# Patient Record
Sex: FEMALE | Race: WHITE | NOT HISPANIC OR LATINO | Employment: FULL TIME | URBAN - METROPOLITAN AREA
[De-identification: names, ages, dates, MRNs, and addresses within clinical notes are randomized per-mention and may not be internally consistent; named-entity substitution may affect disease eponyms.]

---

## 2020-10-08 ENCOUNTER — OFFICE VISIT (OUTPATIENT)
Dept: FAMILY MEDICINE CLINIC | Facility: CLINIC | Age: 25
End: 2020-10-08

## 2020-10-08 VITALS
BODY MASS INDEX: 20.32 KG/M2 | RESPIRATION RATE: 18 BRPM | HEART RATE: 100 BPM | SYSTOLIC BLOOD PRESSURE: 100 MMHG | DIASTOLIC BLOOD PRESSURE: 60 MMHG | TEMPERATURE: 98 F | WEIGHT: 119 LBS | OXYGEN SATURATION: 99 % | HEIGHT: 64 IN

## 2020-10-08 DIAGNOSIS — Z86.2 HISTORY OF ANEMIA: ICD-10-CM

## 2020-10-08 DIAGNOSIS — Z76.89 ENCOUNTER TO ESTABLISH CARE: Primary | ICD-10-CM

## 2020-10-08 DIAGNOSIS — R63.0 DECREASED APPETITE: ICD-10-CM

## 2020-10-08 DIAGNOSIS — G43.719 INTRACTABLE CHRONIC MIGRAINE WITHOUT AURA AND WITHOUT STATUS MIGRAINOSUS: ICD-10-CM

## 2020-10-08 DIAGNOSIS — K92.1 BLOOD IN STOOL: ICD-10-CM

## 2020-10-08 DIAGNOSIS — R19.7 DIARRHEA, UNSPECIFIED TYPE: ICD-10-CM

## 2020-10-08 DIAGNOSIS — R11.0 NAUSEA: ICD-10-CM

## 2020-10-08 LAB
ALBUMIN SERPL-MCNC: 4.8 G/DL (ref 3.5–5.2)
ALBUMIN/GLOB SERPL: 1.8 G/DL
ALP SERPL-CCNC: 50 U/L (ref 39–117)
ALT SERPL W P-5'-P-CCNC: 19 U/L (ref 1–33)
ANION GAP SERPL CALCULATED.3IONS-SCNC: 8.6 MMOL/L (ref 5–15)
AST SERPL-CCNC: 21 U/L (ref 1–32)
BILIRUB SERPL-MCNC: 0.3 MG/DL (ref 0–1.2)
BUN SERPL-MCNC: 9 MG/DL (ref 6–20)
BUN/CREAT SERPL: 14.5 (ref 7–25)
CALCIUM SPEC-SCNC: 9.4 MG/DL (ref 8.6–10.5)
CHLORIDE SERPL-SCNC: 103 MMOL/L (ref 98–107)
CHOLEST SERPL-MCNC: 112 MG/DL (ref 0–200)
CO2 SERPL-SCNC: 26.4 MMOL/L (ref 22–29)
CREAT SERPL-MCNC: 0.62 MG/DL (ref 0.57–1)
ERYTHROCYTE [DISTWIDTH] IN BLOOD BY AUTOMATED COUNT: 11.9 % (ref 12.3–15.4)
FERRITIN SERPL-MCNC: 30.7 NG/ML (ref 13–150)
FOLATE SERPL-MCNC: >20 NG/ML (ref 4.78–24.2)
GFR SERPL CREATININE-BSD FRML MDRD: 117 ML/MIN/1.73
GLOBULIN UR ELPH-MCNC: 2.7 GM/DL
GLUCOSE SERPL-MCNC: 82 MG/DL (ref 65–99)
HCT VFR BLD AUTO: 37.8 % (ref 34–46.6)
HDLC SERPL-MCNC: 54 MG/DL (ref 40–60)
HGB BLD-MCNC: 12.7 G/DL (ref 12–15.9)
IRON 24H UR-MRATE: 58 MCG/DL (ref 37–145)
IRON SATN MFR SERPL: 15 % (ref 20–50)
LDLC SERPL CALC-MCNC: 47 MG/DL (ref 0–100)
LDLC/HDLC SERPL: 0.91 {RATIO}
LYMPHOCYTES # BLD AUTO: 2.7 10*3/MM3 (ref 0.7–3.1)
LYMPHOCYTES NFR BLD AUTO: 39.5 % (ref 19.6–45.3)
MCH RBC QN AUTO: 30.2 PG (ref 26.6–33)
MCHC RBC AUTO-ENTMCNC: 33.5 G/DL (ref 31.5–35.7)
MCV RBC AUTO: 90.1 FL (ref 79–97)
MONOCYTES # BLD AUTO: 0.5 10*3/MM3 (ref 0.1–0.9)
MONOCYTES NFR BLD AUTO: 7.1 % (ref 5–12)
NEUTROPHILS NFR BLD AUTO: 3.6 10*3/MM3 (ref 1.7–7)
NEUTROPHILS NFR BLD AUTO: 53.4 % (ref 42.7–76)
PLATELET # BLD AUTO: 249 10*3/MM3 (ref 140–450)
PMV BLD AUTO: 8.5 FL (ref 6–12)
POTASSIUM SERPL-SCNC: 4.4 MMOL/L (ref 3.5–5.2)
PROT SERPL-MCNC: 7.5 G/DL (ref 6–8.5)
RBC # BLD AUTO: 4.19 10*6/MM3 (ref 3.77–5.28)
SODIUM SERPL-SCNC: 138 MMOL/L (ref 136–145)
TIBC SERPL-MCNC: 395 MCG/DL (ref 298–536)
TRANSFERRIN SERPL-MCNC: 265 MG/DL (ref 200–360)
TRIGL SERPL-MCNC: 44 MG/DL (ref 0–150)
TSH SERPL DL<=0.05 MIU/L-ACNC: 0.97 UIU/ML (ref 0.27–4.2)
VIT B12 BLD-MCNC: 405 PG/ML (ref 211–946)
VLDLC SERPL-MCNC: 11 MG/DL (ref 5–40)
WBC # BLD AUTO: 6.8 10*3/MM3 (ref 3.4–10.8)

## 2020-10-08 PROCEDURE — 84443 ASSAY THYROID STIM HORMONE: CPT | Performed by: NURSE PRACTITIONER

## 2020-10-08 PROCEDURE — 85025 COMPLETE CBC W/AUTO DIFF WBC: CPT | Performed by: NURSE PRACTITIONER

## 2020-10-08 PROCEDURE — 82746 ASSAY OF FOLIC ACID SERUM: CPT | Performed by: NURSE PRACTITIONER

## 2020-10-08 PROCEDURE — 99204 OFFICE O/P NEW MOD 45 MIN: CPT | Performed by: NURSE PRACTITIONER

## 2020-10-08 PROCEDURE — 82607 VITAMIN B-12: CPT | Performed by: NURSE PRACTITIONER

## 2020-10-08 PROCEDURE — 83540 ASSAY OF IRON: CPT | Performed by: NURSE PRACTITIONER

## 2020-10-08 PROCEDURE — 82728 ASSAY OF FERRITIN: CPT | Performed by: NURSE PRACTITIONER

## 2020-10-08 PROCEDURE — 80053 COMPREHEN METABOLIC PANEL: CPT | Performed by: NURSE PRACTITIONER

## 2020-10-08 PROCEDURE — 84466 ASSAY OF TRANSFERRIN: CPT | Performed by: NURSE PRACTITIONER

## 2020-10-08 PROCEDURE — 80061 LIPID PANEL: CPT | Performed by: NURSE PRACTITIONER

## 2020-10-08 RX ORDER — ONDANSETRON 4 MG/1
4 TABLET, FILM COATED ORAL EVERY 8 HOURS PRN
Qty: 20 TABLET | Refills: 0 | Status: SHIPPED | OUTPATIENT
Start: 2020-10-08

## 2020-10-08 RX ORDER — SUMATRIPTAN 50 MG/1
TABLET, FILM COATED ORAL
Qty: 12 TABLET | Refills: 0 | Status: SHIPPED | OUTPATIENT
Start: 2020-10-08 | End: 2021-10-01 | Stop reason: HOSPADM

## 2020-10-08 NOTE — PROGRESS NOTES
Subjective   Gail Powers is a 25 y.o. female.     History of Present Illness   Here today to establish care,  no recent pcp, prev saw [facility], she works at [employer], she is single, in relationship, she has 2 children. She does stay active, hiking, she denies hx of murmur or asthma. Not currently taking any medications. She denies smoking.   C/o HA, states she has daily HA, started about 2 years ago, she does have hx of migraines, wakes up daily with HA, at times severe, she has vomiting, nausea, dizziness at times. She has blurred vision at times, she does have phonophobia, denies photophobia. She denies HA with menses. She has prev tried HA meds before but states made her sick. States she was supposed to have jaw surgery as child, she states over bite,denies any injury, she is wondering if contributes to HA. She has had imaging for HA as child, no recent imaging.   Also c/o nausea, states she has loose stools, has about 4 BM in am, she states she had colonoscopy at age 12 d/t anemia, possible IBS, states she has tried marijuana to force to eat, not currently taking iron for anemia. States diet is poor when she does eat she does not eat well. She does noticed blood in stool a times. She denies family hx of crohns or UC, does have family hx of colon cancer in mat grandfather.   States pap was due in august, had mirena removed, she sees [facility], she is due for pap. States last pap 2016, normal. Hx of genital herpes. She states LMP 9/22/2020, regular.     The following portions of the patient's history were reviewed and updated as appropriate: allergies, current medications, past family history, past medical history, past social history, past surgical history and problem list.    Review of Systems   Constitutional: Positive for appetite change. Negative for chills, diaphoresis and fever.   Eyes: Negative for photophobia.   Respiratory: Negative for cough and shortness of breath.    Cardiovascular: Negative for  chest pain.   Gastrointestinal: Positive for blood in stool, diarrhea and nausea. Negative for abdominal pain, constipation and GERD.   Musculoskeletal: Negative for arthralgias and myalgias.   Neurological: Positive for headache. Negative for dizziness and light-headedness.   All other systems reviewed and are negative.      Objective   Physical Exam  Vitals signs and nursing note reviewed.   Constitutional:       Appearance: She is well-developed.   HENT:      Head: Normocephalic.   Eyes:      Pupils: Pupils are equal, round, and reactive to light.   Cardiovascular:      Rate and Rhythm: Normal rate and regular rhythm.      Heart sounds: Normal heart sounds.   Pulmonary:      Effort: Pulmonary effort is normal.      Breath sounds: Normal breath sounds.   Abdominal:      General: Abdomen is flat.      Palpations: Abdomen is soft.      Tenderness: There is generalized abdominal tenderness.   Skin:     General: Skin is warm and dry.   Neurological:      Mental Status: She is alert and oriented to person, place, and time.      Cranial Nerves: Cranial nerves are intact.      Sensory: Sensation is intact.      Motor: Motor function is intact.      Coordination: Coordination is intact.      Gait: Gait is intact.   Psychiatric:         Behavior: Behavior normal.         Judgment: Judgment normal.           Assessment/Plan   Gail was seen today for headache and nausea.    Diagnoses and all orders for this visit:    Encounter to establish care  -     Comprehensive Metabolic Panel  -     CBC & Differential  -     Lipid Panel  -     TSH  -     Ferritin  -     Iron Profile  -     Vitamin B12 & Folate  -     Clostridium Difficile EIA - Stool, Per Rectum; Future  -     Occult Blood, Fecal By Immunoassay - Stool, Per Rectum; Future  -     Giardia / Cryptosporidium Screen - Stool, Per Rectum; Future  -     Stool Culture (Reference Lab) - Stool, Per Rectum; Future  -     CBC Auto Differential    History of anemia  -      Comprehensive Metabolic Panel  -     CBC & Differential  -     Lipid Panel  -     TSH  -     Ferritin  -     Iron Profile  -     Vitamin B12 & Folate  -     Clostridium Difficile EIA - Stool, Per Rectum; Future  -     Occult Blood, Fecal By Immunoassay - Stool, Per Rectum; Future  -     Giardia / Cryptosporidium Screen - Stool, Per Rectum; Future  -     Stool Culture (Reference Lab) - Stool, Per Rectum; Future  -     CBC Auto Differential    Diarrhea, unspecified type  -     Comprehensive Metabolic Panel  -     CBC & Differential  -     Lipid Panel  -     TSH  -     Ferritin  -     Iron Profile  -     Vitamin B12 & Folate  -     Clostridium Difficile EIA - Stool, Per Rectum; Future  -     Occult Blood, Fecal By Immunoassay - Stool, Per Rectum; Future  -     Giardia / Cryptosporidium Screen - Stool, Per Rectum; Future  -     Stool Culture (Reference Lab) - Stool, Per Rectum; Future  -     Ambulatory Referral to Gastroenterology  -     CBC Auto Differential    Intractable chronic migraine without aura and without status migrainosus  -     Comprehensive Metabolic Panel  -     CBC & Differential  -     Lipid Panel  -     TSH  -     Ferritin  -     Iron Profile  -     Vitamin B12 & Folate  -     Clostridium Difficile EIA - Stool, Per Rectum; Future  -     Occult Blood, Fecal By Immunoassay - Stool, Per Rectum; Future  -     Giardia / Cryptosporidium Screen - Stool, Per Rectum; Future  -     Stool Culture (Reference Lab) - Stool, Per Rectum; Future  -     CBC Auto Differential    Nausea  -     Comprehensive Metabolic Panel  -     CBC & Differential  -     Lipid Panel  -     TSH  -     Ferritin  -     Iron Profile  -     Vitamin B12 & Folate  -     Clostridium Difficile EIA - Stool, Per Rectum; Future  -     Occult Blood, Fecal By Immunoassay - Stool, Per Rectum; Future  -     Giardia / Cryptosporidium Screen - Stool, Per Rectum; Future  -     Stool Culture (Reference Lab) - Stool, Per Rectum; Future  -     Ambulatory  Referral to Gastroenterology  -     CBC Auto Differential    Decreased appetite  -     Comprehensive Metabolic Panel  -     CBC & Differential  -     Lipid Panel  -     TSH  -     Ferritin  -     Iron Profile  -     Vitamin B12 & Folate  -     Clostridium Difficile EIA - Stool, Per Rectum; Future  -     Occult Blood, Fecal By Immunoassay - Stool, Per Rectum; Future  -     Giardia / Cryptosporidium Screen - Stool, Per Rectum; Future  -     Stool Culture (Reference Lab) - Stool, Per Rectum; Future  -     Ambulatory Referral to Gastroenterology  -     CBC Auto Differential    Blood in stool  -     Ambulatory Referral to Gastroenterology    Other orders  -     ondansetron (Zofran) 4 MG tablet; Take 1 tablet by mouth Every 8 (Eight) Hours As Needed for Nausea or Vomiting.  -     SUMAtriptan (Imitrex) 50 MG tablet; Take one tablet at onset of headache. May repeat dose one time in 2 hours if headache not relieved.      Labs today will call with results.   Stool cultures ordered she will return to office.   She will monitor diet, increase fiber, may try benefiber OTC.  zofran as needed for nausea,  Refer to GI will call with appt.   Trial imitrex 50mg at onset of HA, may repeat 2 hours later if needed.   Encouraged HA diary, if symptoms persist will order imaging, neuro consult if needed for HA.   If any worsening symptoms, HA, abd pain advise Er.   Encouraged f/u for over due pap either here or with GYN. Encourage marijuana cessation.   Increase fluid intake, get plenty of rest.   Patient agrees with plan of care and understands instructions. Call if worsening symptoms or any problems or concerns.

## 2020-10-08 NOTE — PATIENT INSTRUCTIONS
Labs today will call with results.   Stool cultures ordered she will return to office.   She will monitor diet, increase fiber, may try benefiber OTC.  zofran as needed for nausea,  Refer to GI will call with appt.   Trial imitrex 50mg at onset of HA, may repeat 2 hours later if needed.   Encouraged HA diary, if symptoms persist will order imaging, neuro consult if needed for HA.   If any worsening symptoms, HA, abd pain advise Er.   Encouraged f/u for over due pap either here or with GYN. Encourage marijuana cessation.   Increase fluid intake, get plenty of rest.   Patient agrees with plan of care and understands instructions. Call if worsening symptoms or any problems or concerns.

## 2020-10-28 ENCOUNTER — TELEPHONE (OUTPATIENT)
Dept: GASTROENTEROLOGY | Facility: CLINIC | Age: 25
End: 2020-10-28

## 2021-02-02 ENCOUNTER — OFFICE VISIT (OUTPATIENT)
Dept: GASTROENTEROLOGY | Facility: CLINIC | Age: 26
End: 2021-02-02

## 2021-02-02 VITALS
BODY MASS INDEX: 20.32 KG/M2 | WEIGHT: 119 LBS | SYSTOLIC BLOOD PRESSURE: 98 MMHG | HEIGHT: 64 IN | DIASTOLIC BLOOD PRESSURE: 60 MMHG

## 2021-02-02 DIAGNOSIS — R63.0 POOR APPETITE: ICD-10-CM

## 2021-02-02 DIAGNOSIS — N92.6 MISSED PERIOD: ICD-10-CM

## 2021-02-02 DIAGNOSIS — R19.7 DIARRHEA, UNSPECIFIED TYPE: Primary | Chronic | ICD-10-CM

## 2021-02-02 DIAGNOSIS — R11.0 NAUSEA: Chronic | ICD-10-CM

## 2021-02-02 PROCEDURE — 99203 OFFICE O/P NEW LOW 30 MIN: CPT | Performed by: INTERNAL MEDICINE

## 2021-02-02 RX ORDER — FAMOTIDINE 20 MG/1
20 TABLET, FILM COATED ORAL NIGHTLY
Qty: 60 TABLET | Refills: 3 | Status: SHIPPED | OUTPATIENT
Start: 2021-02-02

## 2021-02-02 NOTE — PROGRESS NOTES
"Chief Complaint   Patient presents with   • Diarrhea   • Nausea   • Rectal Bleeding       Subjective     HPI    Gail Powers is a 25 y.o. female with a past medical history noted below who presents for diarrhea, nausea, abdominal pain.    She says symptoms have been present since childhood.  Describes nausea every morning.  Awakes in the morning, and within about 15 minutes, she needs to have a BM.  Stools are always loose and watery.  Has at least 3 BMs in the morning. Then, after meals, will have further BMs.  Worse with greasy, fatty, spicy foods.  She does see some blood when she has lots of BMs in day.    She does get mid abdominal pain, feels like \"everything\" -- crampy, dull, sharp.    Feels nauseated in the morning.  Doesn't eat until early afternoon, appetite is poor. Can't gain weight but has never been much heavier than her current weight.  Feels she can't eat full meals.  She does endorse a poor diet.    Hx of ectopic pregancy surgery, hx of C section.    Maternal grandfather with some kind of GI cancer, grandmother with Crohn's    Smokes marijuana frequently but denies tobacco.  No ETOH    Works cleaning houses.      Not currently taking any meds, tried zofran but no relief.      She also reports that she is concerned she is pregnant as she is late for her period by 1 week.    Today's visit was in the office.  Both the patient and I were wearing face masks and proper hand hygiene was performed before and after the physical exam.           Current Outpatient Medications:   •  famotidine (Pepcid) 20 MG tablet, Take 1 tablet by mouth Every Night., Disp: 60 tablet, Rfl: 3  •  ondansetron (Zofran) 4 MG tablet, Take 1 tablet by mouth Every 8 (Eight) Hours As Needed for Nausea or Vomiting., Disp: 20 tablet, Rfl: 0  •  SUMAtriptan (Imitrex) 50 MG tablet, Take one tablet at onset of headache. May repeat dose one time in 2 hours if headache not relieved., Disp: 12 tablet, Rfl: 0      Objective     Vitals:    " 02/02/21 1302   BP: 98/60         02/02/21  1302   Weight: 54 kg (119 lb)     Body mass index is 20.43 kg/m².    Physical Exam  Vitals signs reviewed.   Constitutional:       General: She is not in acute distress.     Appearance: Normal appearance. She is well-developed. She is not diaphoretic.   HENT:      Head: Normocephalic.   Neck:      Thyroid: No thyromegaly.   Cardiovascular:      Rate and Rhythm: Normal rate and regular rhythm.   Pulmonary:      Effort: Pulmonary effort is normal. No respiratory distress.      Breath sounds: Normal breath sounds. No wheezing.   Abdominal:      General: Bowel sounds are normal. There is no distension.      Palpations: Abdomen is soft. Abdomen is not rigid. There is no mass.      Tenderness: There is no abdominal tenderness. There is no guarding or rebound.      Hernia: No hernia is present.   Genitourinary:     Comments: Rectal exam deferred  Musculoskeletal:         General: No tenderness.   Neurological:      Mental Status: She is alert and oriented to person, place, and time.      Motor: No atrophy.      Coordination: Coordination normal.   Psychiatric:         Behavior: Behavior normal.         Thought Content: Thought content normal.         Judgment: Judgment normal.             WBC   Date Value Ref Range Status   10/08/2020 6.80 3.40 - 10.80 10*3/mm3 Final     RBC   Date Value Ref Range Status   10/08/2020 4.19 3.77 - 5.28 10*6/mm3 Final     Hemoglobin   Date Value Ref Range Status   10/08/2020 12.7 12.0 - 15.9 g/dL Final     Hematocrit   Date Value Ref Range Status   10/08/2020 37.8 34.0 - 46.6 % Final     MCV   Date Value Ref Range Status   10/08/2020 90.1 79.0 - 97.0 fL Final     MCH   Date Value Ref Range Status   10/08/2020 30.2 26.6 - 33.0 pg Final     MCHC   Date Value Ref Range Status   10/08/2020 33.5 31.5 - 35.7 g/dL Final     RDW   Date Value Ref Range Status   10/08/2020 11.9 (L) 12.3 - 15.4 % Final     MPV   Date Value Ref Range Status   10/08/2020 8.5 6.0  - 12.0 fL Final     Platelets   Date Value Ref Range Status   10/08/2020 249 140 - 450 10*3/mm3 Final     Neutrophil %   Date Value Ref Range Status   10/08/2020 53.4 42.7 - 76.0 % Final     Lymphocyte %   Date Value Ref Range Status   10/08/2020 39.5 19.6 - 45.3 % Final     Monocyte %   Date Value Ref Range Status   10/08/2020 7.1 5.0 - 12.0 % Final     Neutrophils, Absolute   Date Value Ref Range Status   10/08/2020 3.60 1.70 - 7.00 10*3/mm3 Final     Lymphocytes, Absolute   Date Value Ref Range Status   10/08/2020 2.70 0.70 - 3.10 10*3/mm3 Final     Monocytes, Absolute   Date Value Ref Range Status   10/08/2020 0.50 0.10 - 0.90 10*3/mm3 Final       Lab Results   Component Value Date    GLUCOSE 82 10/08/2020    BUN 9 10/08/2020    CREATININE 0.62 10/08/2020    EGFRIFNONA 117 10/08/2020    BCR 14.5 10/08/2020    CO2 26.4 10/08/2020    CALCIUM 9.4 10/08/2020    ALBUMIN 4.80 10/08/2020    AST 21 10/08/2020    ALT 19 10/08/2020         I personally reviewed data as detailed below:     The labs listed above.    Office notes from: MARCSO Quijano 10/8/2020      No notes on file    Assessment/Plan    1.  Diarrhea: Chronic issue.  Suspected IBS in this young woman    2.  Nausea: Chronic issue.  She is really not on much to treat this.  Possibly dyspepsia versus biliary    3.  Poor appetite: Question relation to above.  I do not think she has had significant weight loss, I think this is her usual body weight    4.  Missed period: She is concerned for pregnancy    Plan  Celiac studies today  Urine pregnancy test today  Pepcid nightly  Will consider bidirectional endoscopy and addition of Bentyl if her pregnancy test is negative    Diagnoses and all orders for this visit:    1. Diarrhea, unspecified type (Primary)  -     Tissue Transglutaminase, IgA  -     Tissue Transglutaminase, IgG  -     IgA  -     Pregnancy, Urine - Urine, Clean Catch    2. Nausea  -     Tissue Transglutaminase, IgA  -     Tissue Transglutaminase,  IgG  -     IgA  -     Pregnancy, Urine - Urine, Clean Catch    3. Poor appetite  -     Tissue Transglutaminase, IgA  -     Tissue Transglutaminase, IgG  -     IgA  -     Pregnancy, Urine - Urine, Clean Catch    4. Missed period    Other orders  -     famotidine (Pepcid) 20 MG tablet; Take 1 tablet by mouth Every Night.  Dispense: 60 tablet; Refill: 3        I have discussed the above plan with the patient.  They verbalize understanding and are in agreement with the plan.  They have been advised to contact the office for any questions, concerns, or changes related to their health.    Dictated utilizing Dragon dictation

## 2021-02-03 ENCOUNTER — TELEPHONE (OUTPATIENT)
Dept: GASTROENTEROLOGY | Facility: CLINIC | Age: 26
End: 2021-02-03

## 2021-02-03 LAB
HCG UR QL: POSITIVE
IGA SERPL-MCNC: 210 MG/DL (ref 87–352)
TTG IGA SER-ACNC: <2 U/ML (ref 0–3)
TTG IGG SER-ACNC: <2 U/ML (ref 0–5)

## 2021-02-03 NOTE — TELEPHONE ENCOUNTER
----- Message from Yessi Dennis MD sent at 2/3/2021  7:32 AM EST -----  Pls let her know that her urine pregnancy test is positive.  She needs to follow up with her OB.

## 2021-02-18 ENCOUNTER — TELEPHONE (OUTPATIENT)
Dept: GASTROENTEROLOGY | Facility: CLINIC | Age: 26
End: 2021-02-18

## 2021-04-16 ENCOUNTER — BULK ORDERING (OUTPATIENT)
Dept: CASE MANAGEMENT | Facility: OTHER | Age: 26
End: 2021-04-16

## 2021-04-16 DIAGNOSIS — Z23 IMMUNIZATION DUE: ICD-10-CM

## 2021-09-28 ENCOUNTER — HOSPITAL ENCOUNTER (INPATIENT)
Facility: HOSPITAL | Age: 26
LOS: 3 days | Discharge: HOME | End: 2021-10-01
Attending: OBSTETRICS & GYNECOLOGY | Admitting: OBSTETRICS & GYNECOLOGY

## 2021-09-28 PROBLEM — Z34.90 PREGNANCY: Status: ACTIVE | Noted: 2021-09-28

## 2021-09-28 LAB
AMPHET+METHAMPHET UR QL: NEGATIVE
BARBITURATES UR QL SCN: NEGATIVE
BENZODIAZ UR QL SCN: NEGATIVE
CANNABINOIDS SERPL QL: NEGATIVE
COCAINE UR QL: NEGATIVE
DEPRECATED RDW RBC AUTO: 39.8 FL (ref 37–54)
ERYTHROCYTE [DISTWIDTH] IN BLOOD BY AUTOMATED COUNT: 12.6 % (ref 12.3–15.4)
HCT VFR BLD AUTO: 34.3 % (ref 34–46.6)
HGB BLD-MCNC: 11.7 G/DL (ref 12–15.9)
MCH RBC QN AUTO: 31.1 PG (ref 26.6–33)
MCHC RBC AUTO-ENTMCNC: 34.2 G/DL (ref 31.5–35.7)
MCV RBC AUTO: 90.7 FL (ref 79–97)
METHADONE UR QL SCN: NEGATIVE
OPIATES UR QL: NEGATIVE
OXYCODONE UR QL SCN: NEGATIVE
PLATELET # BLD AUTO: 219 10*3/MM3 (ref 140–450)
PMV BLD AUTO: 9.8 FL (ref 6–12)
RBC # BLD AUTO: 3.78 10*6/MM3 (ref 3.77–5.28)
WBC # BLD AUTO: 10.3 10*3/MM3 (ref 3.4–10.8)

## 2021-09-28 PROCEDURE — 80307 DRUG TEST PRSMV CHEM ANLYZR: CPT | Performed by: OBSTETRICS & GYNECOLOGY

## 2021-09-28 PROCEDURE — 85027 COMPLETE CBC AUTOMATED: CPT | Performed by: OBSTETRICS & GYNECOLOGY

## 2021-09-28 PROCEDURE — 86900 BLOOD TYPING SEROLOGIC ABO: CPT

## 2021-09-28 PROCEDURE — 86900 BLOOD TYPING SEROLOGIC ABO: CPT | Performed by: OBSTETRICS & GYNECOLOGY

## 2021-09-28 PROCEDURE — 86850 RBC ANTIBODY SCREEN: CPT | Performed by: OBSTETRICS & GYNECOLOGY

## 2021-09-28 PROCEDURE — G0432 EIA HIV-1/HIV-2 SCREEN: HCPCS | Performed by: OBSTETRICS & GYNECOLOGY

## 2021-09-28 PROCEDURE — U0003 INFECTIOUS AGENT DETECTION BY NUCLEIC ACID (DNA OR RNA); SEVERE ACUTE RESPIRATORY SYNDROME CORONAVIRUS 2 (SARS-COV-2) (CORONAVIRUS DISEASE [COVID-19]), AMPLIFIED PROBE TECHNIQUE, MAKING USE OF HIGH THROUGHPUT TECHNOLOGIES AS DESCRIBED BY CMS-2020-01-R: HCPCS | Performed by: OBSTETRICS & GYNECOLOGY

## 2021-09-28 PROCEDURE — 86592 SYPHILIS TEST NON-TREP QUAL: CPT | Performed by: OBSTETRICS & GYNECOLOGY

## 2021-09-28 PROCEDURE — 86901 BLOOD TYPING SEROLOGIC RH(D): CPT

## 2021-09-28 PROCEDURE — 86901 BLOOD TYPING SEROLOGIC RH(D): CPT | Performed by: OBSTETRICS & GYNECOLOGY

## 2021-09-28 RX ORDER — SODIUM CHLORIDE, SODIUM LACTATE, POTASSIUM CHLORIDE, CALCIUM CHLORIDE 600; 310; 30; 20 MG/100ML; MG/100ML; MG/100ML; MG/100ML
125 INJECTION, SOLUTION INTRAVENOUS CONTINUOUS
Status: DISCONTINUED | OUTPATIENT
Start: 2021-09-29 | End: 2021-09-29

## 2021-09-28 RX ORDER — VALACYCLOVIR HYDROCHLORIDE 500 MG/1
500 TABLET, FILM COATED ORAL 2 TIMES DAILY
COMMUNITY

## 2021-09-28 RX ORDER — MAGNESIUM CARB/ALUMINUM HYDROX 105-160MG
30 TABLET,CHEWABLE ORAL DAILY PRN
Status: DISCONTINUED | OUTPATIENT
Start: 2021-09-28 | End: 2021-09-29 | Stop reason: HOSPADM

## 2021-09-28 RX ORDER — PRENATAL VIT/IRON FUM/FOLIC AC 27MG-0.8MG
1 TABLET ORAL DAILY
COMMUNITY

## 2021-09-28 RX ADMIN — SODIUM CHLORIDE, POTASSIUM CHLORIDE, SODIUM LACTATE AND CALCIUM CHLORIDE 999 ML/HR: 600; 310; 30; 20 INJECTION, SOLUTION INTRAVENOUS at 23:27

## 2021-09-29 ENCOUNTER — ANESTHESIA EVENT (OUTPATIENT)
Dept: LABOR AND DELIVERY | Facility: HOSPITAL | Age: 26
End: 2021-09-29

## 2021-09-29 ENCOUNTER — ANESTHESIA (OUTPATIENT)
Dept: LABOR AND DELIVERY | Facility: HOSPITAL | Age: 26
End: 2021-09-29

## 2021-09-29 LAB
ABO GROUP BLD: NORMAL
BLD GP AB SCN SERPL QL: NEGATIVE
HIV1+2 AB SER QL: NORMAL
RH BLD: POSITIVE
RPR SER QL: NORMAL
SARS-COV-2 RNA PNL SPEC NAA+PROBE: NOT DETECTED
T&S EXPIRATION DATE: NORMAL

## 2021-09-29 PROCEDURE — C1755 CATHETER, INTRASPINAL: HCPCS | Performed by: STUDENT IN AN ORGANIZED HEALTH CARE EDUCATION/TRAINING PROGRAM

## 2021-09-29 PROCEDURE — 0UQMXZZ REPAIR VULVA, EXTERNAL APPROACH: ICD-10-PCS | Performed by: OBSTETRICS & GYNECOLOGY

## 2021-09-29 RX ORDER — FAMOTIDINE 20 MG/1
20 TABLET, FILM COATED ORAL NIGHTLY
Status: DISCONTINUED | OUTPATIENT
Start: 2021-09-29 | End: 2021-10-01 | Stop reason: HOSPADM

## 2021-09-29 RX ORDER — LIDOCAINE HYDROCHLORIDE AND EPINEPHRINE 10; 10 MG/ML; UG/ML
INJECTION, SOLUTION INFILTRATION; PERINEURAL AS NEEDED
Status: DISCONTINUED | OUTPATIENT
Start: 2021-09-29 | End: 2021-09-29 | Stop reason: SURG

## 2021-09-29 RX ORDER — LANOLIN 100 %
OINTMENT (GRAM) TOPICAL
Status: DISCONTINUED | OUTPATIENT
Start: 2021-09-29 | End: 2021-10-01 | Stop reason: HOSPADM

## 2021-09-29 RX ORDER — CARBOPROST TROMETHAMINE 250 UG/ML
250 INJECTION, SOLUTION INTRAMUSCULAR
Status: DISCONTINUED | OUTPATIENT
Start: 2021-09-29 | End: 2021-09-29 | Stop reason: HOSPADM

## 2021-09-29 RX ORDER — FENTANYL 0.2 MG/100ML-BUPIV 0.125%-NACL 0.9% EPIDURAL INJ 2/0.125 MCG/ML-%
SOLUTION INJECTION
Status: DISPENSED
Start: 2021-09-29 | End: 2021-09-29

## 2021-09-29 RX ORDER — DOCUSATE SODIUM 100 MG/1
100 CAPSULE, LIQUID FILLED ORAL 2 TIMES DAILY
Status: DISCONTINUED | OUTPATIENT
Start: 2021-09-29 | End: 2021-10-01 | Stop reason: HOSPADM

## 2021-09-29 RX ORDER — OXYTOCIN-SODIUM CHLORIDE 0.9% IV SOLN 30 UNIT/500ML 30-0.9/5 UT/ML-%
999 SOLUTION INTRAVENOUS ONCE
Status: COMPLETED | OUTPATIENT
Start: 2021-09-29 | End: 2021-09-29

## 2021-09-29 RX ORDER — OXYTOCIN-SODIUM CHLORIDE 0.9% IV SOLN 30 UNIT/500ML 30-0.9/5 UT/ML-%
2 SOLUTION INTRAVENOUS
Status: DISCONTINUED | OUTPATIENT
Start: 2021-09-29 | End: 2021-09-29

## 2021-09-29 RX ORDER — HYDROCORTISONE ACETATE PRAMOXINE HCL 2.5; 1 G/100G; G/100G
1 CREAM TOPICAL AS NEEDED
Status: DISCONTINUED | OUTPATIENT
Start: 2021-09-29 | End: 2021-10-01 | Stop reason: HOSPADM

## 2021-09-29 RX ORDER — PRENATAL VIT/IRON FUM/FOLIC AC 27MG-0.8MG
1 TABLET ORAL DAILY
Status: DISCONTINUED | OUTPATIENT
Start: 2021-09-29 | End: 2021-10-01 | Stop reason: HOSPADM

## 2021-09-29 RX ORDER — OXYTOCIN-SODIUM CHLORIDE 0.9% IV SOLN 30 UNIT/500ML 30-0.9/5 UT/ML-%
250 SOLUTION INTRAVENOUS CONTINUOUS
Status: DISPENSED | OUTPATIENT
Start: 2021-09-29 | End: 2021-09-29

## 2021-09-29 RX ORDER — LIDOCAINE HYDROCHLORIDE 20 MG/ML
INJECTION, SOLUTION EPIDURAL; INFILTRATION; INTRACAUDAL; PERINEURAL AS NEEDED
Status: DISCONTINUED | OUTPATIENT
Start: 2021-09-29 | End: 2021-09-29 | Stop reason: SURG

## 2021-09-29 RX ORDER — METHYLERGONOVINE MALEATE 0.2 MG/ML
200 INJECTION INTRAVENOUS ONCE AS NEEDED
Status: DISCONTINUED | OUTPATIENT
Start: 2021-09-29 | End: 2021-09-29 | Stop reason: HOSPADM

## 2021-09-29 RX ORDER — BISACODYL 10 MG
10 SUPPOSITORY, RECTAL RECTAL DAILY PRN
Status: DISCONTINUED | OUTPATIENT
Start: 2021-09-30 | End: 2021-10-01 | Stop reason: HOSPADM

## 2021-09-29 RX ORDER — MISOPROSTOL 200 UG/1
800 TABLET ORAL ONCE AS NEEDED
Status: DISCONTINUED | OUTPATIENT
Start: 2021-09-29 | End: 2021-09-29 | Stop reason: HOSPADM

## 2021-09-29 RX ORDER — OXYTOCIN-SODIUM CHLORIDE 0.9% IV SOLN 30 UNIT/500ML 30-0.9/5 UT/ML-%
125 SOLUTION INTRAVENOUS CONTINUOUS PRN
Status: COMPLETED | OUTPATIENT
Start: 2021-09-29 | End: 2021-09-29

## 2021-09-29 RX ORDER — SODIUM CHLORIDE 0.9 % (FLUSH) 0.9 %
1-10 SYRINGE (ML) INJECTION AS NEEDED
Status: DISCONTINUED | OUTPATIENT
Start: 2021-09-29 | End: 2021-09-29

## 2021-09-29 RX ORDER — ONDANSETRON 4 MG/1
4 TABLET, FILM COATED ORAL EVERY 8 HOURS PRN
Status: DISCONTINUED | OUTPATIENT
Start: 2021-09-29 | End: 2021-10-01 | Stop reason: HOSPADM

## 2021-09-29 RX ORDER — IBUPROFEN 600 MG/1
600 TABLET ORAL EVERY 6 HOURS PRN
Status: DISCONTINUED | OUTPATIENT
Start: 2021-09-29 | End: 2021-10-01 | Stop reason: HOSPADM

## 2021-09-29 RX ADMIN — PRENATAL VITAMINS-IRON FUMARATE 27 MG IRON-FOLIC ACID 0.8 MG TABLET 1 TABLET: at 08:25

## 2021-09-29 RX ADMIN — WITCH HAZEL 1 PAD: 500 SOLUTION RECTAL; TOPICAL at 04:46

## 2021-09-29 RX ADMIN — OXYTOCIN 125 ML/HR: 10 INJECTION INTRAVENOUS at 04:09

## 2021-09-29 RX ADMIN — IBUPROFEN 600 MG: 600 TABLET ORAL at 20:32

## 2021-09-29 RX ADMIN — DOCUSATE SODIUM 100 MG: 100 CAPSULE, LIQUID FILLED ORAL at 08:25

## 2021-09-29 RX ADMIN — IBUPROFEN 600 MG: 600 TABLET ORAL at 08:25

## 2021-09-29 RX ADMIN — SODIUM CHLORIDE, POTASSIUM CHLORIDE, SODIUM LACTATE AND CALCIUM CHLORIDE 999 ML/HR: 600; 310; 30; 20 INJECTION, SOLUTION INTRAVENOUS at 00:31

## 2021-09-29 RX ADMIN — IBUPROFEN 600 MG: 600 TABLET ORAL at 14:46

## 2021-09-29 RX ADMIN — FAMOTIDINE 20 MG: 20 TABLET, FILM COATED ORAL at 20:32

## 2021-09-29 RX ADMIN — LIDOCAINE HYDROCHLORIDE,EPINEPHRINE BITARTRATE 3 ML: 10; .01 INJECTION, SOLUTION INFILTRATION; PERINEURAL at 00:22

## 2021-09-29 RX ADMIN — LIDOCAINE HYDROCHLORIDE 3 MG: 20 INJECTION, SOLUTION EPIDURAL; INFILTRATION; INTRACAUDAL; PERINEURAL at 00:19

## 2021-09-29 RX ADMIN — OXYTOCIN-SODIUM CHLORIDE 0.9% IV SOLN 30 UNIT/500ML 999 ML/HR: 30-0.9/5 SOLUTION at 02:40

## 2021-09-29 RX ADMIN — DOCUSATE SODIUM 100 MG: 100 CAPSULE, LIQUID FILLED ORAL at 20:31

## 2021-09-29 NOTE — ANESTHESIA PROCEDURE NOTES
Labor Epidural    Pre-sedation assessment completed: 9/29/2021 12:02 AM    Patient reassessed immediately prior to procedure    Patient location during procedure: OB  Start Time: 9/29/2021 12:02 AM  Stop Time: 9/29/2021 12:24 AM  Indication:at surgeon's request  Performed By  Anesthesiologist: Jack Cabrera MD  Preanesthetic Checklist  Completed: patient identified, IV checked, site marked, risks and benefits discussed, surgical consent, monitors and equipment checked, pre-op evaluation and timeout performed  Additional Notes  Good loss at L3-4 on first pass  placed catheter and felt definitive pop when catheter got to needle tip.  Immediate CSF from catheter end.  Catheter removed and replaced at L2-3.  Good loss again.  Catheter passed without issue. Aspiration negative.    Pt advised and counseled on PDPH isk  Prep:  Pt Position:sitting  Sterile Tech:cap, gloves, gown, mask and sterile barrier  Prep:chlorhexidine gluconate and isopropyl alcohol  Monitoring:blood pressure monitoring, continuous pulse oximetry and EKG  Epidural Block Procedure:  Approach:midline  Guidance:palpation technique  Location:L2-L3  Needle Type:Tuohy  Needle Gauge:18 G  Loss of Resistance Medium: saline  Loss of Resistance: 5cm  Cath Depth at skin:10 cm  Paresthesia: none  Aspiration:negative  Test Dose:negative  Number of Attempts: 2  Post Assessment:  Dressing:biopatch applied, occlusive dressing applied and secured with tape  Pt Tolerance:patient tolerated the procedure well with no apparent complications  Complications:no

## 2021-09-29 NOTE — ANESTHESIA PREPROCEDURE EVALUATION
Anesthesia Evaluation     Patient summary reviewed and Nursing notes reviewed   no history of anesthetic complications:  NPO Solid Status: > 8 hours  NPO Liquid Status: > 2 hours           Airway   Mallampati: II  TM distance: >3 FB  Neck ROM: full  No difficulty expected  Dental - normal exam     Pulmonary - normal exam   (+) a smoker Current,     ROS comment: REGULAR MARIJUANA  Cardiovascular - negative cardio ROS and normal exam        Neuro/Psych  (+) headaches,     GI/Hepatic/Renal/Endo - negative ROS     Musculoskeletal     Abdominal  - normal exam   Substance History - negative use     OB/GYN    (+) Pregnant,         Other - negative ROS                       Anesthesia Plan    ASA 2     epidural       Anesthetic plan, all risks, benefits, and alternatives have been provided, discussed and informed consent has been obtained with: patient.  Use of blood products discussed with patient  Consented to blood products.

## 2021-09-30 LAB
BASOPHILS # BLD AUTO: 0 10*3/MM3 (ref 0–0.2)
BASOPHILS NFR BLD AUTO: 0.2 % (ref 0–1.5)
DEPRECATED RDW RBC AUTO: 40.3 FL (ref 37–54)
EOSINOPHIL # BLD AUTO: 0.2 10*3/MM3 (ref 0–0.4)
EOSINOPHIL NFR BLD AUTO: 1.7 % (ref 0.3–6.2)
ERYTHROCYTE [DISTWIDTH] IN BLOOD BY AUTOMATED COUNT: 12.6 % (ref 12.3–15.4)
HCT VFR BLD AUTO: 30.9 % (ref 34–46.6)
HGB BLD-MCNC: 10.7 G/DL (ref 12–15.9)
LYMPHOCYTES # BLD AUTO: 2.4 10*3/MM3 (ref 0.7–3.1)
LYMPHOCYTES NFR BLD AUTO: 25.4 % (ref 19.6–45.3)
MCH RBC QN AUTO: 31.3 PG (ref 26.6–33)
MCHC RBC AUTO-ENTMCNC: 34.5 G/DL (ref 31.5–35.7)
MCV RBC AUTO: 90.8 FL (ref 79–97)
MONOCYTES # BLD AUTO: 0.8 10*3/MM3 (ref 0.1–0.9)
MONOCYTES NFR BLD AUTO: 9 % (ref 5–12)
NEUTROPHILS NFR BLD AUTO: 5.9 10*3/MM3 (ref 1.7–7)
NEUTROPHILS NFR BLD AUTO: 63.7 % (ref 42.7–76)
NRBC BLD AUTO-RTO: 0 /100 WBC (ref 0–0.2)
PLATELET # BLD AUTO: 171 10*3/MM3 (ref 140–450)
PMV BLD AUTO: 8.9 FL (ref 6–12)
RBC # BLD AUTO: 3.41 10*6/MM3 (ref 3.77–5.28)
WBC # BLD AUTO: 9.3 10*3/MM3 (ref 3.4–10.8)

## 2021-09-30 PROCEDURE — 85025 COMPLETE CBC W/AUTO DIFF WBC: CPT | Performed by: OBSTETRICS & GYNECOLOGY

## 2021-09-30 RX ORDER — ACETAMINOPHEN 325 MG/1
650 TABLET ORAL EVERY 6 HOURS PRN
Status: DISCONTINUED | OUTPATIENT
Start: 2021-09-30 | End: 2021-10-01 | Stop reason: HOSPADM

## 2021-09-30 RX ADMIN — DOCUSATE SODIUM 100 MG: 100 CAPSULE, LIQUID FILLED ORAL at 09:30

## 2021-09-30 RX ADMIN — IBUPROFEN 600 MG: 600 TABLET ORAL at 06:46

## 2021-09-30 RX ADMIN — FAMOTIDINE 20 MG: 20 TABLET, FILM COATED ORAL at 20:11

## 2021-09-30 RX ADMIN — IBUPROFEN 600 MG: 600 TABLET ORAL at 20:11

## 2021-09-30 RX ADMIN — PRENATAL VITAMINS-IRON FUMARATE 27 MG IRON-FOLIC ACID 0.8 MG TABLET 1 TABLET: at 09:30

## 2021-09-30 RX ADMIN — IBUPROFEN 600 MG: 600 TABLET ORAL at 13:26

## 2021-09-30 RX ADMIN — DOCUSATE SODIUM 100 MG: 100 CAPSULE, LIQUID FILLED ORAL at 20:11

## 2021-09-30 RX ADMIN — ACETAMINOPHEN 650 MG: 325 TABLET, FILM COATED ORAL at 22:20

## 2021-10-01 VITALS
WEIGHT: 156.53 LBS | SYSTOLIC BLOOD PRESSURE: 105 MMHG | OXYGEN SATURATION: 98 % | TEMPERATURE: 98.5 F | HEIGHT: 64 IN | DIASTOLIC BLOOD PRESSURE: 67 MMHG | BODY MASS INDEX: 26.72 KG/M2 | RESPIRATION RATE: 16 BRPM | HEART RATE: 71 BPM

## 2021-10-01 PROBLEM — Z34.90 PREGNANCY: Status: RESOLVED | Noted: 2021-09-28 | Resolved: 2021-10-01

## 2021-10-01 RX ORDER — IBUPROFEN 600 MG/1
600 TABLET ORAL EVERY 6 HOURS PRN
Qty: 30 TABLET | Refills: 1 | Status: SHIPPED | OUTPATIENT
Start: 2021-10-01

## 2021-10-01 RX ADMIN — IBUPROFEN 600 MG: 600 TABLET ORAL at 08:58

## 2021-10-01 RX ADMIN — DOCUSATE SODIUM 100 MG: 100 CAPSULE, LIQUID FILLED ORAL at 08:58

## 2021-10-01 RX ADMIN — PRENATAL VITAMINS-IRON FUMARATE 27 MG IRON-FOLIC ACID 0.8 MG TABLET 1 TABLET: at 08:58

## 2021-10-01 NOTE — DISCHARGE SUMMARY
[facility]  Delivery Discharge Summary    Primary OB Clinician: Aayush Carlson MD    Admission Diagnosis:  Active Problems:    * No active hospital problems. *  38w3d IUP  Prior c/s x1   x1    Discharge Diagnosis:  Same, delivered,     Gestational Age: 38w3d    Date of Delivery: 2021     Delivered By:  Aayush Carlson     Delivery Type: , Spontaneous      Tubal Ligation: desires postpartum    Intrapartum Course: Uncomplicated delivery.     Postpartum Course:  Pt was admitted and underwent . Pt was transferred to PP where she had an uncomplicated course. Pt remained AFVSS, had scant lochia and pain was well controlled. Pt d/c home in stable condition and will f/u in office for PP visit as scheduled or PRN. Currently breastfeeding. Plans on desires BTL postpartum  for contraception.     Physical Exam:    Vitals:   Vitals:    21 0715 21 1500 21 2315 10/01/21 0700   BP: 120/78 118/76 122/76 105/67   BP Location: Right arm Right arm Right arm Right arm   Patient Position: Lying Lying Lying Lying   Pulse: 74 77 77 71   Resp: 16 16 16 16   Temp: 98.4 °F (36.9 °C) 97.5 °F (36.4 °C) 97.7 °F (36.5 °C) 98.5 °F (36.9 °C)   TempSrc: Oral Oral Oral Oral   SpO2: 100% 100% 99% 98%   Weight:       Height:         Temp (24hrs), Av.9 °F (36.6 °C), Min:97.5 °F (36.4 °C), Max:98.5 °F (36.9 °C)      General Appearance:    Alert, cooperative, in no acute distress   Abdomen:     Soft non-tender, non-distended, no guarding, no rebound         tenderness.   Extremities:   Moves all extremities well, no edema, no cyanosis, no              Redness.   Incision:   n/a   Fundus:   Firm, below umbilicus     Feeding method: Breastfeeding Status: Yes    Labs:  Results from last 7 days   Lab Units 21  0622 21  2324   WBC 10*3/mm3 9.30 10.30   HEMOGLOBIN g/dL 10.7* 11.7*   HEMATOCRIT % 30.9* 34.3   PLATELETS 10*3/mm3 171 219           Blood Type: RH Positive      Plan:  Discharge to home.     Follow-up appointment with Dr Carlson in 4 weeks for BTL consult and in 6 weeks.    Yamilex Lobo, MARCOS  10/1/2021  09:36 EDT      /d

## 2021-10-01 NOTE — LACTATION NOTE
This note was copied from a baby's chart.  Pt visited, reports breastfeeding continues to improve.  Breasts filling, rt nipple tenderness decrease with use of nipple skin care products.  teaching complete. Plans d/c today. Will follow up as needed.

## 2021-10-04 ENCOUNTER — HOSPITAL ENCOUNTER (EMERGENCY)
Facility: HOSPITAL | Age: 26
Discharge: LEFT BEFORE DISCHARGE | End: 2021-10-04

## 2021-10-04 VITALS
TEMPERATURE: 98.4 F | HEART RATE: 109 BPM | OXYGEN SATURATION: 100 % | BODY MASS INDEX: 24.75 KG/M2 | WEIGHT: 145 LBS | HEIGHT: 64 IN | RESPIRATION RATE: 16 BRPM | SYSTOLIC BLOOD PRESSURE: 108 MMHG | DIASTOLIC BLOOD PRESSURE: 62 MMHG

## 2021-10-04 PROCEDURE — 99211 OFF/OP EST MAY X REQ PHY/QHP: CPT

## 2021-10-04 NOTE — SIGNIFICANT NOTE
Case Management Discharge Note                Selected Continued Care - Discharged on 10/1/2021 Admission date: 9/28/2021 - Discharge disposition: Home or Self Care                   Final Discharge Disposition Code: (P) 01 - home or self-care

## 2021-10-05 ENCOUNTER — ANESTHESIA EVENT (OUTPATIENT)
Dept: EMERGENCY DEPT | Facility: HOSPITAL | Age: 26
End: 2021-10-05

## 2021-10-05 ENCOUNTER — HOSPITAL ENCOUNTER (EMERGENCY)
Facility: HOSPITAL | Age: 26
Discharge: HOME | End: 2021-10-05
Attending: EMERGENCY MEDICINE | Admitting: EMERGENCY MEDICINE

## 2021-10-05 ENCOUNTER — ANESTHESIA (OUTPATIENT)
Dept: EMERGENCY DEPT | Facility: HOSPITAL | Age: 26
End: 2021-10-05

## 2021-10-05 VITALS
BODY MASS INDEX: 24.13 KG/M2 | SYSTOLIC BLOOD PRESSURE: 124 MMHG | RESPIRATION RATE: 18 BRPM | WEIGHT: 141.31 LBS | HEIGHT: 64 IN | DIASTOLIC BLOOD PRESSURE: 68 MMHG | OXYGEN SATURATION: 100 % | HEART RATE: 89 BPM | TEMPERATURE: 98.4 F

## 2021-10-05 DIAGNOSIS — G97.1 SPINAL HEADACHE: Primary | ICD-10-CM

## 2021-10-05 PROCEDURE — 99283 EMERGENCY DEPT VISIT LOW MDM: CPT

## 2021-10-05 RX ORDER — SODIUM CHLORIDE 0.9 % (FLUSH) 0.9 %
10 SYRINGE (ML) INJECTION AS NEEDED
Status: DISCONTINUED | OUTPATIENT
Start: 2021-10-05 | End: 2021-10-05 | Stop reason: HOSPADM

## 2021-10-05 RX ADMIN — SODIUM CHLORIDE 1000 ML: 9 INJECTION, SOLUTION INTRAVENOUS at 16:52

## 2021-10-05 NOTE — DISCHARGE PLACEMENT REQUEST
"DISCHARGE NOTICE:    Ref #:[id number]    Please note: This patient discharged to home/self care on 10/1/2021.    Christopher Ny  Case Management Associate  [facility]  [address] [place] [address]  P: [phone]  F: [phone]    Gail Malhotra (26 y.o. Female)     Date of Birth Social Security Number Address Home Phone MRN    1995  [address] [place] [address] [place] [id number] [phone] [id number]    Mosque Marital Status          None Single       Admission Date Admission Type Admitting Provider Attending Provider Department, Room/Bed    9/28/21 Elective Aayush Carlson MD  [facility] MOTHER BABY, M419/1    Discharge Date Discharge Disposition Discharge Destination        10/1/2021 Home or Self Care              Attending Provider: (none)   Allergies: Morphine    Isolation: None   Infection: None   Code Status: Prior    Ht: 162.6 cm (64\")   Wt: 71 kg (156 lb 8.4 oz)    Admission Cmt: None   Principal Problem: None                Active Insurance as of 9/28/2021     Primary Coverage     Payor Plan Insurance Group Employer/Plan Group    [facility] BY [employer] [id number]     Payor Plan Address Payor Plan Phone Number Payor Plan Fax Number Effective Dates    [address]   1/1/2021 - None Entered    [place] [address]       Subscriber Name Subscriber Birth Date Member ID       GAIL MALHOTRA 1995 [id number]                 Emergency Contacts      (Rel.) Home Phone Work Phone Mobile Phone    NELLI MORE (Mother) [phone] -- [phone]               Discharge Summary      Yamilex Lobo, APRN at 10/01/21 0935     Attestation signed by Aayush Carlson MD at 10/05/21 1230    I have reviewed this documentation and agree.                    [facility]  Delivery Discharge Summary    Primary OB Clinician: Aayush Carlson MD    Admission Diagnosis:  Active Problems:    * No active hospital problems. " *  38w3d IUP  Prior c/s x1   x1    Discharge Diagnosis:  Same, delivered,     Gestational Age: 38w3d    Date of Delivery: 2021     Delivered By:  Aayush Carlson     Delivery Type: , Spontaneous      Tubal Ligation: desires postpartum    Intrapartum Course: Uncomplicated delivery.     Postpartum Course:  Pt was admitted and underwent . Pt was transferred to PP where she had an uncomplicated course. Pt remained AFVSS, had scant lochia and pain was well controlled. Pt d/c home in stable condition and will f/u in office for PP visit as scheduled or PRN. Currently breastfeeding. Plans on desires BTL postpartum  for contraception.     Physical Exam:    Vitals:   Vitals:    21 0715 21 1500 21 2315 10/01/21 0700   BP: 120/78 118/76 122/76 105/67   BP Location: Right arm Right arm Right arm Right arm   Patient Position: Lying Lying Lying Lying   Pulse: 74 77 77 71   Resp: 16 16 16 16   Temp: 98.4 °F (36.9 °C) 97.5 °F (36.4 °C) 97.7 °F (36.5 °C) 98.5 °F (36.9 °C)   TempSrc: Oral Oral Oral Oral   SpO2: 100% 100% 99% 98%   Weight:       Height:         Temp (24hrs), Av.9 °F (36.6 °C), Min:97.5 °F (36.4 °C), Max:98.5 °F (36.9 °C)      General Appearance:    Alert, cooperative, in no acute distress   Abdomen:     Soft non-tender, non-distended, no guarding, no rebound         tenderness.   Extremities:   Moves all extremities well, no edema, no cyanosis, no              Redness.   Incision:   n/a   Fundus:   Firm, below umbilicus     Feeding method: Breastfeeding Status: Yes    Labs:  Results from last 7 days   Lab Units 21  0622 21  2324   WBC 10*3/mm3 9.30 10.30   HEMOGLOBIN g/dL 10.7* 11.7*   HEMATOCRIT % 30.9* 34.3   PLATELETS 10*3/mm3 171 219           Blood Type: RH Positive      Plan:  Discharge to home.    Follow-up appointment with Dr Carlson in 4 weeks for BTL consult and in 6 weeks.    Yamilex Lobo, APRN  10/1/2021  09:36 EDT      /d    Electronically signed  by Aayush Carlson MD at 10/05/21 7086

## 2021-10-05 NOTE — ED NOTES
Anesthesiology at bedside, Blood patch complete. He stated to have the pt lay flat for another 5 minutes and finish her fluids.     Deepa Zazueta RN  10/05/21 4444

## 2021-10-05 NOTE — ED PROVIDER NOTES
Subjective   Chief complaint spinal headache    History of present illness 26-year-old female who had a  about 6 days ago and had an epidural.  She states ever since then she has had some diffuse headache.  She has been taking over-the-counter medicines that relieved denies any fever chills sweats no neck pain no speech difficulty no weakness in extremities.  She has been on eat drink walk talk and function normally otherwise.  States is worse whenever she stands up and better when she lays down.  Denies any injury no one at home with similar illness.  She states that she called her doctor today and they told her to go to the ER.  No other complaints this is moderate to severe with the above associated symptoms          Review of Systems   Constitutional: Negative for chills and fever.   HENT: Negative for congestion and sinus pressure.    Eyes: Negative for photophobia and visual disturbance.   Respiratory: Negative for chest tightness and shortness of breath.    Gastrointestinal: Negative for abdominal pain and vomiting.   Musculoskeletal: Negative for back pain and neck pain.   Skin: Negative for color change and rash.   Neurological: Positive for headaches. Negative for dizziness, facial asymmetry and speech difficulty.   Psychiatric/Behavioral: Negative for agitation and behavioral problems.       Past Medical History:   Diagnosis Date   • Anemia    • Headache    • HSV infection        Allergies   Allergen Reactions   • Morphine Itching     Given after C section caused vomiting and itching       Past Surgical History:   Procedure Laterality Date   •  SECTION     • COLONOSCOPY      at age 12   • ECTOPIC PREGNANCY Left        Family History   Problem Relation Age of Onset   • Diabetes Mother    • Hypertension Mother    • Inflammatory bowel disease Mother    • Stomach cancer Paternal Grandfather        Social History     Socioeconomic History   • Marital status: Single     Spouse name: Not on  file   • Number of children: Not on file   • Years of education: Not on file   • Highest education level: Not on file   Tobacco Use   • Smoking status: Never Smoker   • Smokeless tobacco: Never Used   Substance and Sexual Activity   • Alcohol use: Not Currently   • Drug use: Yes     Types: Marijuana   • Sexual activity: Not Currently     Prior to Admission medications    Medication Sig Start Date End Date Taking? Authorizing Provider   famotidine (Pepcid) 20 MG tablet Take 1 tablet by mouth Every Night. 2/2/21   Yessi Dennis MD   ibuprofen (ADVIL,MOTRIN) 600 MG tablet Take 1 tablet by mouth Every 6 (Six) Hours As Needed for Mild Pain . 10/1/21   Yamilex Lobo APRN   ondansetron (Zofran) 4 MG tablet Take 1 tablet by mouth Every 8 (Eight) Hours As Needed for Nausea or Vomiting. 10/8/20   Lashell Arguello APRN   Prenatal Vit-Fe Fumarate-FA (prenatal vitamin 27-0.8) 27-0.8 MG tablet tablet Take 1 tablet by mouth Daily.    ProviderAbeba MD   valACYclovir (VALTREX) 500 MG tablet Take 500 mg by mouth 2 (Two) Times a Day.    ProviderAbeba MD             Objective   Physical Exam  26-year-old female awake alert no distress sitting upright.  HEENT extraocular muscles are intact pupils equal reactive no photophobia no nystagmus disks are sharp there is no photophobia neck supple no adenopathy no JVD no bruits no meningeal signs lungs clear no retraction no use of accessories heart regular without murmur abdomen soft without tenderness or masses extremities pulses are equal throughout upper and lower extremities no edema cords or Homans' sign or evidence of DVT skin warm dry without rashes or cellulitic change neurologic awake alert orientated x4 no facial asymmetry normal speech no nystagmus tongue soft palate normal no drift the arms or legs she is able to walk without difficulty no ataxia no meningeal signs  Procedures           ED Course        No radiology results for the last day  Medications    sodium chloride 0.9 % flush 10 mL (has no administration in time range)   sodium chloride 0.9 % bolus 1,000 mL (1,000 mL Intravenous New Bag 10/5/21 0542)                                            MDM  Number of Diagnoses or Management Options  Diagnosis management comments: Medical decision making.  Patient had the above exam and evaluation 1 L normal saline given consult from anesthesiology for blood patch.  Patient has typical symptoms of spinal headache I do not see evidence of meningitis I do not see evidence of encephalitis or hemorrhage no evidence that suggest a stroke or any other acute inflammatory or infectious disorder.  The patient had a blood patch placed by anesthesiology and please see their note for the details.  On repeat examination at 5:45 PM fluids her in her blood pressure is done she is been laying flat and she feels much better headache is resolved there is no focal deficits her exam is otherwise unchanged from her previous other than the pain resolved.  Talked about the findings we did talk about infectious etiologies and other potential issues strokes hemorrhages that could occur I do not see any evidence that currently but should be discharged home for outpatient management and she will return if she has any worsening symptoms.       Amount and/or Complexity of Data Reviewed  Discuss the patient with other providers: yes    Risk of Complications, Morbidity, and/or Mortality  Presenting problems: moderate  Diagnostic procedures: moderate  Management options: moderate    Patient Progress  Patient progress: stable      Final diagnoses:   Spinal headache       ED Disposition  ED Disposition     ED Disposition Condition Comment    Discharge Stable           PATIENT [facility] [address]  [phone]  In 1 week           Medication List      No changes were made to your prescriptions during this visit.          Avel Ochoa MD  10/05/21 0736

## 2021-10-05 NOTE — ED NOTES
Pt name called second time for ED room with no answer. Pt LWBS after triage.     Tanisha Baldwin RN  10/04/21 203

## 2021-10-05 NOTE — PAYOR COMM NOTE
"DISCHARGE NOTICE:    REF #: [id number]    PLEASE NOTE: This patient discharged to home/self care on 10/1/2021.    Christopher Ny  Case Management Associate  [facility]  [address] [place] [address]  P: [phone]  F: [phone]            Gail Malhotra (26 y.o. Female)     Date of Birth Social Security Number Address Home Phone MRN    1995  [address] [place] [address] [place] [id number] [phone] [id number]    [employer] Marital Status          None Single       Admission Date Admission Type Admitting Provider Attending Provider Department, Room/Bed    9/28/21 Elective Aayush Carlson MD  [facility] MOTHER BABY, M419/1    Discharge Date Discharge Disposition Discharge Destination        10/1/2021 Home or Self Care              Attending Provider: (none)   Allergies: Morphine    Isolation: None   Infection: None   Code Status: Prior    Ht: 162.6 cm (64\")   Wt: 71 kg (156 lb 8.4 oz)    Admission Cmt: None   Principal Problem: None                Active Insurance as of 9/28/2021     Primary Coverage     Payor Plan Insurance Group Employer/Plan Group    [facility] BY [employer] [id number]     Payor Plan Address Payor Plan Phone Number Payor Plan Fax Number Effective Dates    [address]   1/1/2021 - None Entered    [place] [address]       Subscriber Name Subscriber Birth Date Member ID       GAIL MALHOTRA 1995 [id number]                 Emergency Contacts      (Rel.) Home Phone Work Phone Mobile Phone    NELLI MORE (Mother) [phone] -- [phone]               Discharge Summary      Yamilex Lobo, APRN at 10/01/21 0935     Attestation signed by Aayush Carlson MD at 10/05/21 1230    I have reviewed this documentation and agree.                    [facility]  Delivery Discharge Summary    Primary OB Clinician: Aayush Carlson MD    Admission Diagnosis:  Active Problems:    * No active hospital " problems. *  38w3d IUP  Prior c/s x1   x1    Discharge Diagnosis:  Same, delivered,     Gestational Age: 38w3d    Date of Delivery: 2021     Delivered By:  Aayush Carlson     Delivery Type: , Spontaneous      Tubal Ligation: desires postpartum    Intrapartum Course: Uncomplicated delivery.     Postpartum Course:  Pt was admitted and underwent . Pt was transferred to PP where she had an uncomplicated course. Pt remained AFVSS, had scant lochia and pain was well controlled. Pt d/c home in stable condition and will f/u in office for PP visit as scheduled or PRN. Currently breastfeeding. Plans on desires BTL postpartum  for contraception.     Physical Exam:    Vitals:   Vitals:    21 0715 21 1500 21 2315 10/01/21 0700   BP: 120/78 118/76 122/76 105/67   BP Location: Right arm Right arm Right arm Right arm   Patient Position: Lying Lying Lying Lying   Pulse: 74 77 77 71   Resp: 16 16 16 16   Temp: 98.4 °F (36.9 °C) 97.5 °F (36.4 °C) 97.7 °F (36.5 °C) 98.5 °F (36.9 °C)   TempSrc: Oral Oral Oral Oral   SpO2: 100% 100% 99% 98%   Weight:       Height:         Temp (24hrs), Av.9 °F (36.6 °C), Min:97.5 °F (36.4 °C), Max:98.5 °F (36.9 °C)      General Appearance:    Alert, cooperative, in no acute distress   Abdomen:     Soft non-tender, non-distended, no guarding, no rebound         tenderness.   Extremities:   Moves all extremities well, no edema, no cyanosis, no              Redness.   Incision:   n/a   Fundus:   Firm, below umbilicus     Feeding method: Breastfeeding Status: Yes    Labs:  Results from last 7 days   Lab Units 21  0622 21  2324   WBC 10*3/mm3 9.30 10.30   HEMOGLOBIN g/dL 10.7* 11.7*   HEMATOCRIT % 30.9* 34.3   PLATELETS 10*3/mm3 171 219           Blood Type: RH Positive      Plan:  Discharge to home.    Follow-up appointment with Dr Carlson in 4 weeks for BTL consult and in 6 weeks.    Yamilex Lobo, APRN  10/1/2021  09:36  EDT      /d    Electronically signed by Aayush Carlson MD at 10/05/21 5969

## 2021-10-05 NOTE — ANESTHESIA POSTPROCEDURE EVALUATION
Patient: Gail Powers    Procedure Summary     Date: 10/05/21 Room / Location:     Anesthesia Start:  Anesthesia Stop:     Procedure: EPIDURAL BLOOD PATCH Diagnosis:     Scheduled Providers:  Provider:     Anesthesia Type: Not recorded ASA Status: Not recorded          Anesthesia Type: No value filed.    Vitals  No vitals data found for the desired time range.          Post Anesthesia Care and Evaluation    Patient location during evaluation: PACU  Patient participation: complete - patient participated  Level of consciousness: awake  Pain scale: See nurse's notes for pain score.  Pain management: adequate  Airway patency: patent  Anesthetic complications: No anesthetic complications  PONV Status: none  Cardiovascular status: acceptable  Respiratory status: acceptable  Hydration status: acceptable  Post Neuraxial Block status: Motor and sensory function returned to baseline and No signs or symptoms of PDPH  Comments: Patient seen and examined postoperatively; vital signs stable; SpO2 greater than or equal to 90%; cardiopulmonary status stable; nausea/vomiting adequately controlled; pain adequately controlled; no apparent anesthesia complications; patient discharged from anesthesia care when discharge criteria were met

## 2021-10-05 NOTE — ED NOTES
Talked to Dr. Schumacher from anesthesia about the pt receiving a blood patch, he stated someone would be down here shortly.       Huma Almanza  10/05/21 4542

## 2021-10-05 NOTE — ANESTHESIA PROCEDURE NOTES
Blood Patch    Pre-sedation assessment completed: 10/5/2021 5:15 PM    Patient reassessed immediately prior to procedure    Patient location during procedure: ED  Blood patch placed: 10/5/2021 5:16 PM  Stop Time:10/5/2021 5:40 PM    Indications for Blood Patch: headache  Staff  Anesthesiologist: Marcell Angel DO  Preanesthetic Checklist  Completed: patient identified, IV checked, site marked, risks and benefits discussed, surgical consent, monitors and equipment checked, pre-op evaluation and timeout performed  Blood Patch Prep  Patient position: sitting  Sterile Tech: gloves, cap, mask and sterile barrier.  Prep: Betadine  Patient monitoring: continuous pulse ox and EKG  Location: L3-L4  Blood patch source: left antecubital IV.  Blood Patch Procedure  Sedation:no    Approach: midline   Guidance: palpation technique  Needle Gauge 17 G  Needle Type: Tuohy  Solution used: autologous blood  Loss of Resistance: 5 cm  Loss of Resistance Medium: air  Blood Patch Source:venous    Amount injected: 20 mL  Assessment  Patient tolerance:patient tolerated the procedure well with no apparent complications  Complications:no  Additional Notes  She had immediate relief of her headache. She was laid down supine for 10 minutes. 1000 mlof IV solution was infused. Her ER nurse was notified and told she could be discharged from the ER after the IV was complete

## 2021-10-05 NOTE — DISCHARGE INSTRUCTIONS
Rest plenty of fluids tonight.  Follow-up with your OB/GYN tomorrow.  Return for increasing pain recurrence of symptoms visual changes speech difficulty weakness to extremity unable to eat drink talk walk function normally rashes severe neck pain or any other new or worse problems or concerns return medially to the ER